# Patient Record
Sex: MALE | Race: OTHER | HISPANIC OR LATINO | URBAN - METROPOLITAN AREA
[De-identification: names, ages, dates, MRNs, and addresses within clinical notes are randomized per-mention and may not be internally consistent; named-entity substitution may affect disease eponyms.]

---

## 2021-11-10 ENCOUNTER — OFFICE VISIT (OUTPATIENT)
Dept: URBAN - METROPOLITAN AREA CLINIC 19 | Facility: CLINIC | Age: 61
End: 2021-11-10

## 2023-03-14 ENCOUNTER — LAB OUTSIDE AN ENCOUNTER (OUTPATIENT)
Dept: URBAN - METROPOLITAN AREA CLINIC 19 | Facility: CLINIC | Age: 63
End: 2023-03-14

## 2023-03-14 ENCOUNTER — OFFICE VISIT (OUTPATIENT)
Dept: URBAN - METROPOLITAN AREA CLINIC 19 | Facility: CLINIC | Age: 63
End: 2023-03-14
Payer: MEDICARE

## 2023-03-14 ENCOUNTER — WEB ENCOUNTER (OUTPATIENT)
Dept: URBAN - METROPOLITAN AREA CLINIC 19 | Facility: CLINIC | Age: 63
End: 2023-03-14

## 2023-03-14 VITALS
BODY MASS INDEX: 36.64 KG/M2 | SYSTOLIC BLOOD PRESSURE: 122 MMHG | DIASTOLIC BLOOD PRESSURE: 84 MMHG | HEIGHT: 69 IN | OXYGEN SATURATION: 97 % | HEART RATE: 80 BPM | TEMPERATURE: 97.5 F | WEIGHT: 247.4 LBS

## 2023-03-14 DIAGNOSIS — K76.0 HEPATIC STEATOSIS: ICD-10-CM

## 2023-03-14 PROBLEM — 197321007: Status: ACTIVE | Noted: 2023-03-14

## 2023-03-14 PROCEDURE — 99203 OFFICE O/P NEW LOW 30 MIN: CPT | Performed by: NURSE PRACTITIONER

## 2023-03-14 RX ORDER — ALBUTEROL SULFATE 90 UG/1
AEROSOL, METERED RESPIRATORY (INHALATION)
Qty: 0 | Refills: 0 | Status: ACTIVE | COMMUNITY
Start: 1900-01-01

## 2023-03-14 RX ORDER — APIXABAN 5 MG/1
1 TABLET TABLET, FILM COATED ORAL TWICE A DAY
Status: ACTIVE | COMMUNITY

## 2023-03-14 NOTE — HPI-TODAY'S VISIT:
Last seen in our clinic back in 2019 for dysphagia.  Last EGD done by Dr. Benítez 7/2/2019 found mildly severe esophagitis, surgically altered GE junction, due to dysphagia symptoms he was dilated, mild erythema in the gastric antrum, duodenum was normal.  Path with mild chronic inactive gastritis and chemical/reactive gastropathy.  No H. pylori.  No EOE Colonoscopy done by Dr. Benítez on 8/6/2019.  A 4 mm polyp removed from the sigmoid colon, diverticulosis in the sigmoid colon.  Path hyperplastic polyp.

## 2023-03-14 NOTE — HPI-TODAY'S VISIT:
Mr. Valenzuela is a 63 yo male with PMH of HTN, sleep apnea with CPAP, hernia repair in 1995, Afib s/p cardiac ablation on Eliquis who presents today to discuss fatty liver.  He reports back in 2017, he presented to ER for abdominal pain with N/V. CT scan was done on 1/14/2017 and showed moderate sized hiatal hernia. Diffuse low-attenuation of the liver, suggestive of hepatic steatosis. Diffuse mild dilatation of the small bowel with several air-fluid levels. No focal transition point. Enlarged prostate. Fat containing left inguinal hernia.   He states he never really followed up on it, read the report recently so wanted to discuss.  Reports occasional abdominal bloating after eating. No dysphagia, heartburn/reflux, nausea or vomiting. No changes in bowel habits. No pain. No bloody or black stools.

## 2023-03-15 LAB
A/G RATIO: 1.4
ALBUMIN: 4.4
ALKALINE PHOSPHATASE: 67
ALT (SGPT): 26
AST (SGOT): 16
BILIRUBIN, TOTAL: 0.4
BUN/CREATININE RATIO: (no result)
BUN: 20
CALCIUM: 9.9
CARBON DIOXIDE, TOTAL: 28
CHLORIDE: 104
CREATININE: 0.81
EGFR: 99
GLOBULIN, TOTAL: 3.1
GLUCOSE: 91
HEMATOCRIT: 43.6
HEMOGLOBIN: 15.1
MCH: 29.5
MCHC: 34.6
MCV: 85.2
MPV: 9.9
PLATELET COUNT: 329
POTASSIUM: 4.4
PROTEIN, TOTAL: 7.5
RDW: 12.6
RED BLOOD CELL COUNT: 5.12
SODIUM: 142
WHITE BLOOD CELL COUNT: 6.7

## 2023-03-22 ENCOUNTER — OFFICE VISIT (OUTPATIENT)
Dept: URBAN - METROPOLITAN AREA CLINIC 18 | Facility: CLINIC | Age: 63
End: 2023-03-22

## 2023-03-29 ENCOUNTER — OFFICE VISIT (OUTPATIENT)
Dept: URBAN - METROPOLITAN AREA CLINIC 18 | Facility: CLINIC | Age: 63
End: 2023-03-29
Payer: MEDICARE

## 2023-03-29 DIAGNOSIS — K76.0 HEPATIC STEATOSIS: ICD-10-CM

## 2023-03-29 PROCEDURE — 76705 ECHO EXAM OF ABDOMEN: CPT | Performed by: INTERNAL MEDICINE

## 2023-03-29 RX ORDER — APIXABAN 5 MG/1
1 TABLET TABLET, FILM COATED ORAL TWICE A DAY
Status: ACTIVE | COMMUNITY

## 2023-03-29 RX ORDER — ALBUTEROL SULFATE 90 UG/1
AEROSOL, METERED RESPIRATORY (INHALATION)
Qty: 0 | Refills: 0 | Status: ACTIVE | COMMUNITY
Start: 1900-01-01

## 2023-11-30 ENCOUNTER — LAB OUTSIDE AN ENCOUNTER (OUTPATIENT)
Dept: URBAN - METROPOLITAN AREA CLINIC 19 | Facility: CLINIC | Age: 63
End: 2023-11-30

## 2023-11-30 ENCOUNTER — TELEPHONE ENCOUNTER (OUTPATIENT)
Dept: URBAN - METROPOLITAN AREA CLINIC 19 | Facility: CLINIC | Age: 63
End: 2023-11-30

## 2023-11-30 ENCOUNTER — CLAIMS CREATED FROM THE CLAIM WINDOW (OUTPATIENT)
Dept: URBAN - METROPOLITAN AREA CLINIC 19 | Facility: CLINIC | Age: 63
End: 2023-11-30
Payer: MEDICARE

## 2023-11-30 VITALS
HEIGHT: 69 IN | WEIGHT: 245.8 LBS | OXYGEN SATURATION: 96 % | TEMPERATURE: 97 F | HEART RATE: 61 BPM | SYSTOLIC BLOOD PRESSURE: 130 MMHG | BODY MASS INDEX: 36.4 KG/M2 | DIASTOLIC BLOOD PRESSURE: 70 MMHG

## 2023-11-30 DIAGNOSIS — R13.19 ESOPHAGEAL DYSPHAGIA: ICD-10-CM

## 2023-11-30 DIAGNOSIS — K20.90 ESOPHAGITIS: ICD-10-CM

## 2023-11-30 PROCEDURE — 99214 OFFICE O/P EST MOD 30 MIN: CPT | Performed by: NURSE PRACTITIONER

## 2023-11-30 RX ORDER — PANTOPRAZOLE SODIUM 40 MG/1
1 TABLET TABLET, DELAYED RELEASE ORAL ONCE A DAY
Qty: 90 TABLET | Refills: 3 | OUTPATIENT
Start: 2023-11-30

## 2023-11-30 RX ORDER — ALBUTEROL SULFATE 90 UG/1
AEROSOL, METERED RESPIRATORY (INHALATION)
Qty: 0 | Refills: 0 | Status: ACTIVE | COMMUNITY
Start: 1900-01-01

## 2023-11-30 RX ORDER — APIXABAN 5 MG/1
1 TABLET TABLET, FILM COATED ORAL TWICE A DAY
Status: ACTIVE | COMMUNITY

## 2023-11-30 NOTE — HPI-TODAY'S VISIT:
Mr. Valenzuela is a 64 yo male with PMH of HTN, sleep apnea with CPAP, hernia repair in 1995, Afib s/p cardiac ablation on Eliquis who presents today for ED follow-up of dysphagia.   He was last seen in GI clinic 3/14/2023 for hepatic steatosis.  CBC, CMP at the time unremarkable RUQ US 3/29/2023-echogenic liver suggestive of hepatic steatosis.  No other significant finding.  Presented to [facility] ED on 11/26/2023 complaining of choking episode.  He reported that he was sitting watching football eating pork rice and peas.  He started choking and felt like something was lodged in his esophagus.  Was able to cough it out but has not been able to handle his secretions or tolerate any oral intake since.  Tried carbonated beverages but vomited them back up.  At 1 point had to pull over on the side of the road and vomit secretions.  He still felt like he had a piece of pork lodged in his mid esophagus.  CBC, CMP, PT/INR unremarkable.  CXR with linear scarring at the left lateral lung base.  GI consulted for urgent EGD.  EGD was performed by Dr. Yu grade C acute esophagitis without bleeding.  Food found in the stomach.  Erythematous mucosa in the gastric body.  An antireflux surgical site was found characterized by healthy-appearing mucosa.  Normal duodenum.  Protonix 40 twice daily for 4 weeks.  Path:Distal esophagus biopsy-squamous epithelium with parakeratosis.  Negative for intraepithelial eosinophils.  Mid esophagus-squamous epithelium with no pathologic abnormality negative for intraepithelial eosinophils.  Of note he had a remote history of fundoplication for his hiatal hernia, reports he had barium swallow and was told his wrap may be getting undone.  Has had occasional trouble swallowing in the past but this was his first impaction.   10/25/2021 Barium swallow by Dr. Sherwood:  Status post fundoplication. The wrap appears intact, however it is herniated above the diaphragm. There is a moderate hiatal hernia present. 12.5 mm barium tablet halted at the GE junction at the level of the fundoplication and did not pass even with additional sips of barium. Very mild gastroesophageal reflux seen while the patient was supine. Otherwise unremarkable.    ----------------------------------- Prior history:        He reports back in 2017, he presented to ER for abdominal pain with N/V. CT scan was done on 1/14/2017 and showed moderate sized hiatal hernia. Diffuse low-attenuation of the liver, suggestive of hepatic steatosis. Diffuse mild dilatation of the small bowel with several air-fluid levels. No focal transition point. Enlarged prostate. Fat containing left inguinal hernia.        He states he never really followed up on it, read the report recently so wanted to discuss.         Last seen in our clinic back in 2019 for dysphagia. Last EGD done by Dr. Benítez 7/2/2019 found mildly severe esophagitis, surgically altered GE junction, due to dysphagia symptoms he was dilated, mild erythema in the gastric antrum, duodenum was normal. Path with mild chronic inactive gastritis and chemical/reactive gastropathy. No H. pylori. No EOE        Colonoscopy done by Dr. Benítez on 8/6/2019. A 4 mm polyp removed from the sigmoid colon, diverticulosis in the sigmoid colon. Path hyperplastic polyp...

## 2024-01-22 ENCOUNTER — OFFICE VISIT (OUTPATIENT)
Dept: URBAN - METROPOLITAN AREA CLINIC 19 | Facility: CLINIC | Age: 64
End: 2024-01-22
Payer: MEDICARE

## 2024-01-22 ENCOUNTER — LAB OUTSIDE AN ENCOUNTER (OUTPATIENT)
Dept: URBAN - METROPOLITAN AREA CLINIC 19 | Facility: CLINIC | Age: 64
End: 2024-01-22

## 2024-01-22 VITALS
HEIGHT: 69 IN | BODY MASS INDEX: 36.5 KG/M2 | WEIGHT: 246.4 LBS | TEMPERATURE: 97.2 F | DIASTOLIC BLOOD PRESSURE: 90 MMHG | SYSTOLIC BLOOD PRESSURE: 152 MMHG | HEART RATE: 74 BPM | OXYGEN SATURATION: 95 %

## 2024-01-22 DIAGNOSIS — K20.90 ESOPHAGITIS: ICD-10-CM

## 2024-01-22 DIAGNOSIS — K76.0 HEPATIC STEATOSIS: ICD-10-CM

## 2024-01-22 DIAGNOSIS — R13.19 ESOPHAGEAL DYSPHAGIA: ICD-10-CM

## 2024-01-22 DIAGNOSIS — Z86.010 COLON POLYP HISTORY: ICD-10-CM

## 2024-01-22 PROCEDURE — 99214 OFFICE O/P EST MOD 30 MIN: CPT | Performed by: NURSE PRACTITIONER

## 2024-01-22 RX ORDER — ALBUTEROL SULFATE 90 UG/1
AEROSOL, METERED RESPIRATORY (INHALATION)
Qty: 0 | Refills: 0 | Status: ACTIVE | COMMUNITY
Start: 1900-01-01

## 2024-01-22 RX ORDER — PANTOPRAZOLE SODIUM 40 MG/1
1 TABLET TABLET, DELAYED RELEASE ORAL ONCE A DAY
Qty: 90 TABLET | Refills: 3 | Status: ACTIVE | COMMUNITY
Start: 2023-11-30

## 2024-01-22 RX ORDER — APIXABAN 5 MG/1
1 TABLET TABLET, FILM COATED ORAL TWICE A DAY
Status: ACTIVE | COMMUNITY

## 2024-01-22 NOTE — HPI-TODAY'S VISIT:
Mr. Valenzuela is a 64 yo male with PMH of HTN, sleep apnea with CPAP, hernia repair in 1995, Afib s/p cardiac ablation on Eliquis, hepatic steatosis who presents today for follow-up.   Last seen in GI clinic 11/30/2023 for ED follow up of dysphagia.  Barium swallow ordered for complaints of esophageal dysphagia - 12/15/2023 - the patient is again noted s/p fundoplication. The wrap again appears to be superior to the diaphram with moderate hiatal hernia present. Barium traverse the GE junction at the level of the fundoplication. Moderate reflux. Small diverticulum which apears to be at the posterior oropharynx.  He is still having issues with dysphagia. Continues on Pantoprazole 40mg/day. Due for colonsocopy this year.    ----------------------------------- Prior history: He reports back in 2017, he presented to ER for abdominal pain with N/V. CT scan was done on 1/14/2017 and showed moderate sized hiatal hernia. Diffuse low-attenuation of the liver, suggestive of hepatic steatosis. Diffuse mild dilatation of the small bowel with several air-fluid levels. No focal transition point. Enlarged prostate. Fat containing left inguinal hernia. He states he never really followed up on it, read the report recently so wanted to discuss.  Last seen in our clinic back in 2019 for dysphagia. Last EGD done by Dr. Benítez 7/2/2019 found mildly severe esophagitis, surgically altered GE junction, due to dysphagia symptoms he was dilated, mild erythema in the gastric antrum, duodenum was normal. Path with mild chronic inactive gastritis and chemical/reactive gastropathy. No H. pylori. No EOE Colonoscopy done by Dr. Benítez on 8/6/2019. A 4 mm polyp removed from the sigmoid colon, diverticulosis in the sigmoid colon. Path hyperplastic polyp.  10/25/2021 Barium swallow by Dr. Sherwood:  Status post fundoplication. The wrap appears intact, however it is herniated above the diaphragm. There is a moderate hiatal hernia present. 12.5 mm barium tablet halted at the GE junction at the level of the fundoplication and did not pass even with additional sips of barium. Very mild gastroesophageal reflux seen while the patient was supine. Otherwise unremarkable.   Presented to [facility] ED on 11/26/2023 complaining of choking episode. He reported that he was sitting watching football eating pork rice and peas. He started choking and felt like something was lodged in his esophagus. Was able to cough it out but has not been able to handle his secretions or tolerate any oral intake since. Tried carbonated beverages but vomited them back up. At 1 point had to pull over on the side of the road and vomit secretions. He still felt like he had a piece of pork lodged in his mid esophagus. CBC, CMP, PT/INR unremarkable. CXR with linear scarring at the left lateral lung base. GI consulted for urgent EGD.  EGD was performed by Dr. Yu grade C acute esophagitis without bleeding. Food found in the stomach. Erythematous mucosa in the gastric body. An antireflux surgical site was found characterized by healthy-appearing mucosa. Normal duodenum. Path:Distal esophagus biopsy-squamous epithelium with parakeratosis. Negative for intraepithelial eosinophils. Mid esophagus-squamous epithelium with no pathologic abnormality negative for intraepithelial eosinophils.  Protonix 40 BID for 4 weeks ordered  Of note he had a remote history of fundoplication for his hiatal hernia, reports he had barium swallow and was told his wrap may be getting undone. Has had occasional trouble swallowing in the past but this was his first impaction.  Seen in GI clinic 3/14/2023 for hepatic steatosis. CBC, CMP at the time unremarkable RUQ US 3/29/2023-echogenic liver suggestive of hepatic steatosis. No other significant finding..

## 2024-01-23 LAB
ALBUMIN/GLOBULIN RATIO: 1.5
ALBUMIN: 3.8
ALKALINE PHOSPHATASE: 59
ALT: 21
AST: 12
BILIRUBIN, TOTAL: 0.3
BUN/CREATININE RATIO: (no result)
CALCIUM: 8.7
CARBON DIOXIDE: 27
CHLORIDE: 103
CREATININE: 0.7
EGFR: 103
FIB 4 INDEX: 0.65
FIB 4 INTERPRETATION: (no result)
GLOBULIN: 2.6
GLUCOSE: 87
HEMATOCRIT: 42.5
HEMOGLOBIN: 14
INR: 1.1
MCH: 29.2
MCHC: 32.9
MCV: 88.5
MPV: 9.8
PLATELET COUNT: 257
PLATELET COUNT: 257
POTASSIUM: 4.1
PROTEIN, TOTAL: 6.4
PT: 11.4
RDW: 12.9
RED BLOOD CELL COUNT: 4.8
SODIUM: 140
UREA NITROGEN (BUN): 16
WHITE BLOOD CELL COUNT: 7.5

## 2024-01-31 ENCOUNTER — TELEPHONE ENCOUNTER (OUTPATIENT)
Dept: URBAN - METROPOLITAN AREA CLINIC 19 | Facility: CLINIC | Age: 64
End: 2024-01-31

## 2024-01-31 ENCOUNTER — OFFICE VISIT (OUTPATIENT)
Dept: URBAN - METROPOLITAN AREA CLINIC 18 | Facility: CLINIC | Age: 64
End: 2024-01-31
Payer: MEDICARE

## 2024-01-31 DIAGNOSIS — N28.1 RENAL CYST, ACQUIRED, RIGHT: ICD-10-CM

## 2024-01-31 DIAGNOSIS — K76.0 FATTY (CHANGE OF) LIVER: ICD-10-CM

## 2024-01-31 PROCEDURE — 76705 ECHO EXAM OF ABDOMEN: CPT | Performed by: INTERNAL MEDICINE

## 2024-03-20 ENCOUNTER — LAB (OUTPATIENT)
Dept: URBAN - METROPOLITAN AREA CLINIC 4 | Facility: CLINIC | Age: 64
End: 2024-03-20
Payer: MEDICARE

## 2024-03-20 ENCOUNTER — C/E W/ DIL (OUTPATIENT)
Dept: URBAN - METROPOLITAN AREA SURGERY CENTER 31 | Facility: SURGERY CENTER | Age: 64
End: 2024-03-20
Payer: MEDICARE

## 2024-03-20 DIAGNOSIS — K31.89 ACHYLIA: ICD-10-CM

## 2024-03-20 DIAGNOSIS — R13.10 ABNORMAL DEGLUTITION: ICD-10-CM

## 2024-03-20 DIAGNOSIS — K31.89 OTHER DISEASES OF STOMACH AND DUODENUM: ICD-10-CM

## 2024-03-20 DIAGNOSIS — K21.9 GASTRO-ESOPHAGEAL REFLUX DISEASE WITHOUT ESOPHAGITIS: ICD-10-CM

## 2024-03-20 DIAGNOSIS — Z86.010 ADENOMAS PERSONAL HISTORY OF COLONIC POLYPS: ICD-10-CM

## 2024-03-20 DIAGNOSIS — Z09 CARDIOLOGY FOLLOW-UP ENCOUNTER: ICD-10-CM

## 2024-03-20 DIAGNOSIS — K21.9 ACID REFLUX: ICD-10-CM

## 2024-03-20 PROCEDURE — G0105 COLORECTAL SCRN; HI RISK IND: HCPCS | Performed by: INTERNAL MEDICINE

## 2024-03-20 PROCEDURE — 43239 EGD BIOPSY SINGLE/MULTIPLE: CPT | Performed by: INTERNAL MEDICINE

## 2024-03-20 PROCEDURE — 43249 ESOPH EGD DILATION <30 MM: CPT | Performed by: INTERNAL MEDICINE

## 2024-03-20 PROCEDURE — 88312 SPECIAL STAINS GROUP 1: CPT | Performed by: PATHOLOGY

## 2024-03-20 PROCEDURE — G8907 PT DOC NO EVENTS ON DISCHARG: HCPCS | Performed by: INTERNAL MEDICINE

## 2024-03-20 PROCEDURE — 88305 TISSUE EXAM BY PATHOLOGIST: CPT | Performed by: PATHOLOGY

## 2024-03-20 RX ORDER — ALBUTEROL SULFATE 90 UG/1
AEROSOL, METERED RESPIRATORY (INHALATION)
Qty: 0 | Refills: 0 | Status: ACTIVE | COMMUNITY
Start: 1900-01-01

## 2024-03-20 RX ORDER — APIXABAN 5 MG/1
1 TABLET TABLET, FILM COATED ORAL TWICE A DAY
Status: ACTIVE | COMMUNITY

## 2024-03-20 RX ORDER — PANTOPRAZOLE SODIUM 40 MG/1
1 TABLET TABLET, DELAYED RELEASE ORAL ONCE A DAY
Qty: 90 TABLET | Refills: 3 | Status: ACTIVE | COMMUNITY
Start: 2023-11-30

## 2024-03-28 ENCOUNTER — OV EP (OUTPATIENT)
Dept: URBAN - METROPOLITAN AREA CLINIC 19 | Facility: CLINIC | Age: 64
End: 2024-03-28
Payer: MEDICARE

## 2024-03-28 VITALS
HEART RATE: 71 BPM | SYSTOLIC BLOOD PRESSURE: 152 MMHG | OXYGEN SATURATION: 97 % | DIASTOLIC BLOOD PRESSURE: 90 MMHG | TEMPERATURE: 97.5 F | HEIGHT: 69 IN | BODY MASS INDEX: 36.97 KG/M2 | WEIGHT: 249.6 LBS

## 2024-03-28 DIAGNOSIS — K57.90 DIVERTICULOSIS: ICD-10-CM

## 2024-03-28 DIAGNOSIS — K44.9 HIATAL HERNIA: ICD-10-CM

## 2024-03-28 DIAGNOSIS — K21.9 GERD: ICD-10-CM

## 2024-03-28 DIAGNOSIS — Z98.890 HISTORY OF FUNDOPLICATION: ICD-10-CM

## 2024-03-28 DIAGNOSIS — K76.0 HEPATIC STEATOSIS: ICD-10-CM

## 2024-03-28 PROCEDURE — 99214 OFFICE O/P EST MOD 30 MIN: CPT | Performed by: NURSE PRACTITIONER

## 2024-03-28 RX ORDER — PANTOPRAZOLE SODIUM 40 MG/1
1 TABLET TABLET, DELAYED RELEASE ORAL ONCE A DAY
Qty: 90 TABLET | Refills: 3 | Status: ACTIVE | COMMUNITY
Start: 2023-11-30

## 2024-03-28 RX ORDER — ALBUTEROL SULFATE 90 UG/1
AEROSOL, METERED RESPIRATORY (INHALATION)
Qty: 0 | Refills: 0 | Status: ACTIVE | COMMUNITY
Start: 1900-01-01

## 2024-03-28 RX ORDER — APIXABAN 5 MG/1
1 TABLET TABLET, FILM COATED ORAL TWICE A DAY
Status: ACTIVE | COMMUNITY

## 2024-03-28 RX ORDER — TADALAFIL 5 MG/1
1 TABLET AS NEEDED TABLET, FILM COATED ORAL ONCE A DAY
Status: ACTIVE | COMMUNITY

## 2024-03-28 NOTE — HPI-TODAY'S VISIT:
Mr. Valenzuela is a 62 yo male with PMH of HTN, sleep apnea with CPAP, hernia repair in 1995, Afib s/p cardiac ablation on Eliquis, hepatic steatosis who presents today for follow-up. Last seen in GI clinic 1/22/2024.  Last US was done on 1/31/2024-echogenic liver essentially unchanged.  Consider fatty infiltration.  Simple cyst of right kidney new on current study.  EGD colonoscopy was performed by Dr. Benítez on 3/20/2024 EGD-medium sized hiatal hernia, erythematous mucosa in the antrum, normal duodenum.  Dilation performed in the lower third of the esophagus. Path:Stomach antrum-negative esophagus middle and lower third biopsies with reflux type changes; negative for Mercado's or EOE. Colonoscopy-diverticulosis in the sigmoid colon, internal hemorrhoids.  No specimens collected.  5-year follow-up given.  Some occasional cramping to LLQ and RLQ, comes and goes. BMs sometimes hard and sometimes diarrhea. Drinks plenty of water, he feels at least 64 oz/day. He weaned off PPI, now only taking Tums and states his reflux has been well-controlled with this. Denies further trouble swallowing at this time.    ----------------------------------- Prior history: -He reports back in 2017, he presented to ER for abdominal pain with N/V. CT scan was done on 1/14/2017 and showed moderate sized hiatal hernia. Diffuse low-attenuation of the liver, suggestive of hepatic steatosis. Diffuse mild dilatation of the small bowel with several air-fluid levels. No focal transition point. Enlarged prostate. Fat containing left inguinal hernia. He states he never really followed up on it.  -Seen in our clinic back in 2019 for dysphagia. EGD done by Dr. Benítez 7/2/2019 found mildly severe esophagitis, surgically altered GE junction, due to dysphagia symptoms he was dilated, mild erythema in the gastric antrum, duodenum was normal. Path with mild chronic inactive gastritis and chemical/reactive gastropathy. No H. pylori. No EOE. Colonoscopy done by Dr. Benítez on 8/6/2019. A 4 mm polyp removed from the sigmoid colon, diverticulosis in the sigmoid colon. Path: hyperplastic polyp.  -10/25/2021 Barium swallow by Dr. Sherwood:  Status post fundoplication. The wrap appears intact, however it is herniated above the diaphragm. There is a moderate hiatal hernia present. 12.5 mm barium tablet halted at the GE junction at the level of the fundoplication and did not pass even with additional sips of barium. Very mild gastroesophageal reflux seen while the patient was supine. Otherwise unremarkable.  -[facility] complaining of choking episode. He reported that he was sitting watching football eating pork rice and peas. He started choking and felt like something was lodged in his esophagus. Was able to cough it out but has not been able to handle his secretions or tolerate any oral intake since. Tried carbonated beverages but vomited them back up. At one point had to pull over on the side of the road and vomit secretions. He still felt like he had a piece of pork lodged in his mid esophagus. CBC, CMP, PT/INR unremarkable. CXR with linear scarring at the left lateral lung base. GI consulted for urgent EGD. -EGD was performed by Dr. Fontanez-LA grade C acute esophagitis without bleeding. Food found in the stomach. Erythematous mucosa in the gastric body. An antireflux surgical site was found characterized by healthy-appearing mucosa. Normal duodenum. Path:Distal esophagus biopsy-squamous epithelium with parakeratosis. Negative for intraepithelial eosinophils. Mid esophagus-squamous epithelium with no pathologic abnormality negative for intraepithelial eosinophils. Protonix 40 BID for 4 weeks ordered  -Seen in GI clinic 3/14/2023 for hepatic steatosis. CBC, CMP at the time unremarkable RUQ US 3/29/2023-echogenic liver suggestive of hepatic steatosis. No other significant finding. -Seen in GI clinic 11/30/2023 for ED follow up of dysphagia.  Barium swallow 12/15/2023 - the patient is again noted s/p fundoplication. The wrap again appears to be superior to the diaphram with moderate hiatal hernia present. Barium traverse the GE junction at the level of the fundoplication. Moderate reflux. Small diverticulum which apears to be at the posterior oropharynx.

## 2024-03-29 LAB
ALBUMIN/GLOBULIN RATIO: 1.6
ALBUMIN: 4.2
ALKALINE PHOSPHATASE: 63
ALT: 20
AST: 14
BILIRUBIN, TOTAL: 0.6
BUN/CREATININE RATIO: (no result)
CALCIUM: 9.4
CARBON DIOXIDE: 28
CHLORIDE: 101
CREATININE: 0.74
EGFR: 101
FIB 4 INDEX: 0.88
FIB 4 INTERPRETATION: (no result)
FIB 4 SUMMARY: (no result)
GLOBULIN: 2.7
GLUCOSE: 108
HEMATOCRIT: 40.7
HEMOGLOBIN: 13.6
INR: 1.1
MCH: 28.9
MCHC: 33.4
MCV: 86.6
MPV: 10.2
PLATELET COUNT: 227
PLATELET COUNT: 227
POTASSIUM: 4.3
PROTEIN, TOTAL: 6.9
PT: 11.5
RDW: 13.7
RED BLOOD CELL COUNT: 4.7
SODIUM: 138
UREA NITROGEN (BUN): 12
WHITE BLOOD CELL COUNT: 7.4

## 2024-06-11 ENCOUNTER — TELEPHONE ENCOUNTER (OUTPATIENT)
Dept: URBAN - METROPOLITAN AREA CLINIC 19 | Facility: CLINIC | Age: 64
End: 2024-06-11

## 2024-06-28 ENCOUNTER — OFFICE VISIT (OUTPATIENT)
Dept: URBAN - METROPOLITAN AREA CLINIC 19 | Facility: CLINIC | Age: 64
End: 2024-06-28
Payer: MEDICARE

## 2024-06-28 ENCOUNTER — DASHBOARD ENCOUNTERS (OUTPATIENT)
Age: 64
End: 2024-06-28

## 2024-06-28 VITALS
HEIGHT: 69 IN | HEART RATE: 66 BPM | OXYGEN SATURATION: 99 % | SYSTOLIC BLOOD PRESSURE: 139 MMHG | WEIGHT: 232.4 LBS | BODY MASS INDEX: 34.42 KG/M2 | TEMPERATURE: 98.6 F | DIASTOLIC BLOOD PRESSURE: 71 MMHG

## 2024-06-28 DIAGNOSIS — K76.0 HEPATIC STEATOSIS: ICD-10-CM

## 2024-06-28 DIAGNOSIS — K44.9 HIATAL HERNIA: ICD-10-CM

## 2024-06-28 PROCEDURE — 99214 OFFICE O/P EST MOD 30 MIN: CPT | Performed by: NURSE PRACTITIONER

## 2024-06-28 RX ORDER — ALBUTEROL SULFATE 90 UG/1
AEROSOL, METERED RESPIRATORY (INHALATION)
Qty: 0 | Refills: 0 | Status: ACTIVE | COMMUNITY
Start: 1900-01-01

## 2024-06-28 RX ORDER — APIXABAN 5 MG/1
1 TABLET TABLET, FILM COATED ORAL TWICE A DAY
Status: ACTIVE | COMMUNITY

## 2024-06-28 RX ORDER — PANTOPRAZOLE SODIUM 40 MG/1
1 TABLET TABLET, DELAYED RELEASE ORAL ONCE A DAY
Qty: 90 TABLET | Refills: 3 | Status: ACTIVE | COMMUNITY
Start: 2023-11-30

## 2024-06-28 RX ORDER — TADALAFIL 5 MG/1
1 TABLET AS NEEDED TABLET, FILM COATED ORAL ONCE A DAY
Status: ACTIVE | COMMUNITY

## 2024-06-28 NOTE — PHYSICAL EXAM GASTROINTESTINAL
Abdomen , soft, nontender, nondistended , no guarding or rigidity , no masses palpable , Liver and Spleen,  no hepatosplenomegaly

## 2024-06-28 NOTE — HPI-TODAY'S VISIT:
Mr. Valenzuela is a 62 yo male with PMH of HTN, ROLAND on CPAP, hernia repair in 1995, Afib s/p cardiac ablation on Eliquis, hepatic steatosis who presents today for follow-up.   US was on 1/31/2024-echogenic liver essentially unchanged. Consider fatty infiltration. Simple cyst of right kidney new on current study.  EGD colonoscopy was performed by Dr. Benítez on 3/20/2024 EGD-medium sized hiatal hernia, erythematous mucosa in the antrum, normal duodenum. Dilation performed in the lower third of the esophagus. Path:Stomach antrum-negative esophagus middle and lower third biopsies with reflux type changes; negative for Mercado's or EOE. Colonoscopy-diverticulosis in the sigmoid colon, internal hemorrhoids. No specimens collected. 5-year follow-up given.  He weaned off PPI, to only taking Tums PRN. He reports trying to lose weight with Atkins diet and appears to have lost about 19 lbs since last visit.  He was referred to Dr. Lockhart to discuss hernia in setting of history of fundoplication. Still awaiting esohageal manometry which he has not yet gotten scheduled.      ----------------------------------- Prior history: -He reports back in 2017, he presented to ER for abdominal pain with N/V. CT scan was done on 1/14/2017 and showed moderate sized hiatal hernia. Diffuse low-attenuation of the liver, suggestive of hepatic steatosis. Diffuse mild dilatation of the small bowel with several air-fluid levels. No focal transition point. Enlarged prostate. Fat containing left inguinal hernia. He states he never really followed up on it.  -Seen in our clinic back in 2019 for dysphagia. EGD done by Dr. Benítez 7/2/2019 found mildly severe esophagitis, surgically altered GE junction, due to dysphagia symptoms he was dilated, mild erythema in the gastric antrum, duodenum was normal. Path with mild chronic inactive gastritis and chemical/reactive gastropathy. No H. pylori. No EOE. Colonoscopy done by Dr. Benítez on 8/6/2019 - 4 mm (benign) polyp removed from the sigmoid colon, diverticulosis in the sigmoid colon.   -10/25/2021 Barium swallow by Dr. Sherwood:  Status post fundoplication. The wrap appears intact, however it is herniated above the diaphragm. There is a moderate hiatal hernia present. 12.5 mm barium tablet halted at the GE junction at the level of the fundoplication and did not pass even with additional sips of barium. Very mild gastroesophageal reflux seen while the patient was supine. Otherwise unremarkable.  -Presented to [facility] ED on 11/26/2023 complaining of choking episode. He reported that he was sitting watching football eating pork rice and peas. He started choking and felt like something was lodged in his esophagus. Was able to cough it out but has not been able to handle his secretions or tolerate any oral intake since. Tried carbonated beverages but vomited them back up. At one point had to pull over on the side of the road and vomit secretions. He still felt like he had a piece of pork lodged in his mid esophagus. CBC, CMP, PT/INR unremarkable. CXR with linear scarring at the left lateral lung base. GI consulted for urgent EGD. -EGD was performed by Dr. Fontanez-LA grade C acute esophagitis without bleeding. Food found in the stomach. Erythematous mucosa in the gastric body. An antireflux surgical site was found characterized by healthy-appearing mucosa. Normal duodenum. Path:Distal esophagus biopsy-squamous epithelium with parakeratosis. Negative for intraepithelial eosinophils. Mid esophagus-squamous epithelium with no pathologic abnormality negative for intraepithelial eosinophils. Protonix 40 BID for 4 weeks ordered  -Seen in GI clinic 3/14/2023 for hepatic steatosis. CBC, CMP at the time unremarkable RUQ US 3/29/2023-echogenic liver suggestive of hepatic steatosis. No other significant finding.  -Seen in GI clinic 11/30/2023 for ED follow up of dysphagia.  Barium swallow 12/15/2023 - the patient is again noted s/p fundoplication. The wrap again appears to be superior to the diaphram with moderate hiatal hernia present. Barium traverse the GE junction at the level of the fundoplication. Moderate reflux. Small diverticulum which apears to be at the posterior oropharynx.

## 2024-06-29 LAB
A/G RATIO: 1.6
ALBUMIN: 4.4
ALKALINE PHOSPHATASE: 66
ALT (SGPT): 21
AST (SGOT): 14
BILIRUBIN, TOTAL: 0.8
BUN/CREATININE RATIO: 29
BUN: 20
CALCIUM: 9.2
CARBON DIOXIDE, TOTAL: 26
CHLORIDE: 103
CREATININE: 0.69
EGFR: 103
GLOBULIN, TOTAL: 2.7
GLUCOSE: 88
HEMATOCRIT: 44.5
HEMOGLOBIN: 14.6
INR: 1.1
MCH: 29.2
MCHC: 32.8
MCV: 89
MPV: 9.8
PLATELET COUNT: 204
POTASSIUM: 4.3
PROTEIN, TOTAL: 7.1
PT: 11.6
RDW: 13.5
RED BLOOD CELL COUNT: 5
SODIUM: 138
WHITE BLOOD CELL COUNT: 5.6

## 2024-07-15 ENCOUNTER — TELEPHONE ENCOUNTER (OUTPATIENT)
Dept: URBAN - METROPOLITAN AREA CLINIC 19 | Facility: CLINIC | Age: 64
End: 2024-07-15

## 2024-07-24 ENCOUNTER — OFFICE VISIT (OUTPATIENT)
Dept: URBAN - METROPOLITAN AREA MEDICAL CENTER 28 | Facility: MEDICAL CENTER | Age: 64
End: 2024-07-24

## 2024-08-09 ENCOUNTER — OFFICE VISIT (OUTPATIENT)
Dept: URBAN - METROPOLITAN AREA CLINIC 19 | Facility: CLINIC | Age: 64
End: 2024-08-09
Payer: MEDICARE

## 2024-08-09 ENCOUNTER — TELEPHONE ENCOUNTER (OUTPATIENT)
Dept: URBAN - METROPOLITAN AREA CLINIC 19 | Facility: CLINIC | Age: 64
End: 2024-08-09

## 2024-08-09 VITALS
DIASTOLIC BLOOD PRESSURE: 80 MMHG | SYSTOLIC BLOOD PRESSURE: 130 MMHG | HEART RATE: 64 BPM | TEMPERATURE: 98.4 F | WEIGHT: 234.8 LBS | BODY MASS INDEX: 34.78 KG/M2 | HEIGHT: 69 IN

## 2024-08-09 DIAGNOSIS — K76.0 HEPATIC STEATOSIS: ICD-10-CM

## 2024-08-09 DIAGNOSIS — K44.9 HIATAL HERNIA: ICD-10-CM

## 2024-08-09 PROCEDURE — 99212 OFFICE O/P EST SF 10 MIN: CPT | Performed by: NURSE PRACTITIONER

## 2024-08-09 RX ORDER — PANTOPRAZOLE SODIUM 40 MG/1
1 TABLET TABLET, DELAYED RELEASE ORAL ONCE A DAY
Qty: 90 TABLET | Refills: 3 | Status: ACTIVE | COMMUNITY
Start: 2023-11-30

## 2024-08-09 RX ORDER — APIXABAN 5 MG/1
1 TABLET TABLET, FILM COATED ORAL TWICE A DAY
Status: ACTIVE | COMMUNITY

## 2024-08-09 RX ORDER — TADALAFIL 5 MG/1
1 TABLET AS NEEDED TABLET, FILM COATED ORAL ONCE A DAY
Status: ACTIVE | COMMUNITY

## 2024-08-09 RX ORDER — ALBUTEROL SULFATE 90 UG/1
AEROSOL, METERED RESPIRATORY (INHALATION)
Qty: 0 | Refills: 0 | Status: ACTIVE | COMMUNITY
Start: 1900-01-01

## 2024-08-09 NOTE — HPI-TODAY'S VISIT:
Mr. Valenzuela is a 64 yo male with PMH of HTN, ROLAND on CPAP, hernia repair in 1995, Afib s/p cardiac ablation on Eliquis, hepatic steatosis who presents today for follow-up.   Last seen in GI clinic 6/28/2024 CBC was normal, creatinine 0.69, sodium 138, T. bili 0.8, ALP 66, AST 14, ALT 21, INR 1.1, platelets 227 Sodium MELD=9 7/1/2024 RUQ US: Hepatic steatosis.  Simple right renal cyst measuring 1.1 cm 7/31/2024 esophageal manometry was overall normal, normal impedence pH study  Last visit, he weaned off PPI, to only taking Tums PRN. He reported losing weight with Atkins diet and appeared he lost about 19 lbs since prior visit. Holding now at 234 lbs.  He reports after walking around, he notices occasionally having some edema to his lower extremities. Comes and goes. None now. Denies any increased swelling in his abdomen.      ----------------------------------- Prior history: -He reports back in 2017, he presented to ER for abdominal pain with N/V. CT scan was done on 1/14/2017 and showed moderate sized hiatal hernia. Diffuse low-attenuation of the liver, suggestive of hepatic steatosis. Diffuse mild dilatation of the small bowel with several air-fluid levels. No focal transition point. Enlarged prostate. Fat containing left inguinal hernia. He states he never really followed up on it.  -Seen in our clinic back in 2019 for dysphagia. EGD done by Dr. Benítez 7/2/2019 found mildly severe esophagitis, surgically altered GE junction, due to dysphagia symptoms he was dilated, mild erythema in the gastric antrum, duodenum was normal. Path with mild chronic inactive gastritis and chemical/reactive gastropathy. No H. pylori. No EOE. Colonoscopy done by Dr. Benítez on 8/6/2019 - 4 mm (benign) polyp removed from the sigmoid colon, diverticulosis in the sigmoid colon.   -10/25/2021 Barium swallow by Dr. Sherwood:  Status post fundoplication. The wrap appears intact, however it is herniated above the diaphragm. There is a moderate hiatal hernia present. 12.5 mm barium tablet halted at the GE junction at the level of the fundoplication and did not pass even with additional sips of barium. Very mild gastroesophageal reflux seen while the patient was supine. Otherwise unremarkable.  -Presented to [facility] ED on 11/26/2023 complaining of choking episode. He reported that he was sitting watching football eating pork rice and peas. He started choking and felt like something was lodged in his esophagus. Was able to cough it out but has not been able to handle his secretions or tolerate any oral intake since. Tried carbonated beverages but vomited them back up. At one point had to pull over on the side of the road and vomit secretions. He still felt like he had a piece of pork lodged in his mid esophagus. CBC, CMP, PT/INR unremarkable. CXR with linear scarring at the left lateral lung base. GI consulted for urgent EGD. -EGD was performed by Dr. Fontanez-LA grade C acute esophagitis without bleeding. Food found in the stomach. Erythematous mucosa in the gastric body. An antireflux surgical site was found characterized by healthy-appearing mucosa. Normal duodenum. Path:Distal esophagus biopsy-squamous epithelium with parakeratosis. Negative for intraepithelial eosinophils. Mid esophagus-squamous epithelium with no pathologic abnormality negative for intraepithelial eosinophils. Protonix 40 BID for 4 weeks ordered  -Seen in GI clinic 3/14/2023 for hepatic steatosis. CBC, CMP at the time unremarkable RUQ US 3/29/2023-echogenic liver suggestive of hepatic steatosis. No other significant finding.  -Seen in GI clinic 11/30/2023 for ED follow up of dysphagia.  Barium swallow 12/15/2023 - the patient is again noted s/p fundoplication. The wrap again appears to be superior to the diaphram with moderate hiatal hernia present. Barium traverse the GE junction at the level of the fundoplication. Moderate reflux. Small diverticulum which apears to be at the posterior oropharynx..  US was on 1/31/2024-echogenic liver essentially unchanged. Consider fatty infiltration. Simple cyst of right kidney new on current study.  EGD colonoscopy was performed by Dr. Benítez on 3/20/2024 EGD-medium sized hiatal hernia, erythematous mucosa in the antrum, normal duodenum. Dilation performed in the lower third of the esophagus. Path:Stomach antrum-negative esophagus middle and lower third biopsies with reflux type changes; negative for Mercado's or EOE. Colonoscopy-diverticulosis in the sigmoid colon, internal hemorrhoids. No specimens collected. 5-year follow-up given..

## 2024-10-09 ENCOUNTER — OFFICE VISIT (OUTPATIENT)
Dept: URBAN - METROPOLITAN AREA CLINIC 19 | Facility: CLINIC | Age: 64
End: 2024-10-09
Payer: MEDICARE

## 2024-10-09 VITALS
TEMPERATURE: 97.9 F | HEART RATE: 61 BPM | WEIGHT: 232.8 LBS | HEIGHT: 69 IN | SYSTOLIC BLOOD PRESSURE: 130 MMHG | BODY MASS INDEX: 34.48 KG/M2 | DIASTOLIC BLOOD PRESSURE: 80 MMHG

## 2024-10-09 DIAGNOSIS — Z98.890 HISTORY OF FUNDOPLICATION: ICD-10-CM

## 2024-10-09 DIAGNOSIS — K76.0 HEPATIC STEATOSIS: ICD-10-CM

## 2024-10-09 PROCEDURE — 99213 OFFICE O/P EST LOW 20 MIN: CPT | Performed by: NURSE PRACTITIONER

## 2024-10-09 RX ORDER — ALBUTEROL SULFATE 90 UG/1
AEROSOL, METERED RESPIRATORY (INHALATION)
Qty: 0 | Refills: 0 | Status: ACTIVE | COMMUNITY
Start: 1900-01-01

## 2024-10-09 RX ORDER — PANTOPRAZOLE SODIUM 40 MG/1
1 TABLET TABLET, DELAYED RELEASE ORAL ONCE A DAY
Qty: 90 TABLET | Refills: 3 | Status: ACTIVE | COMMUNITY
Start: 2023-11-30

## 2024-10-09 RX ORDER — TELMISARTAN 40 MG/1
1 TABLET TABLET ORAL ONCE A DAY
Status: ACTIVE | COMMUNITY

## 2024-10-09 RX ORDER — APIXABAN 5 MG/1
1 TABLET TABLET, FILM COATED ORAL TWICE A DAY
Status: ACTIVE | COMMUNITY

## 2024-10-09 RX ORDER — TADALAFIL 5 MG/1
1 TABLET AS NEEDED TABLET, FILM COATED ORAL ONCE A DAY
Status: ACTIVE | COMMUNITY

## 2024-10-09 NOTE — HPI-TODAY'S VISIT:
Mr. Valenzuela is a 62 yo male with PMH of HTN, ROLAND on CPAP, hernia repair in 1995, Afib s/p cardiac ablation on Eliquis, hepatic steatosis who presents today for follow-up.   6/28/2024 CBC was normal, creatinine 0.69, sodium 138, T. bili 0.8, ALP 66, AST 14, ALT 21, INR 1.1, platelets 227 Sodium MELD=9 7/1/2024 RUQ US: Hepatic steatosis. Simple right renal cyst measuring 1.1 cm 7/31/2024 esophageal manometry was overall normal, normal impedence pH study  Last visit, he weaned off PPI, to only taking Tums PRN. He reported losing weight with diet changes and appeared he lost about total of 20 pounds.  Referral sent to Dr. Lockhart again for hiatal hernia but he has not heard anything yet. Has been having swelling in his legs. Saw a Cardiologist, stopped amlodipine. Started Telmisartan and sees him again next week. Swelling improved. Dr. Malik. Says ECHO was good.      - - - - - - - - - - - - - - - - - - Prior history: -He reports back in 2017, he presented to ER for abdominal pain with N/V. CT scan was done on 1/14/2017 and showed moderate sized hiatal hernia. Diffuse low-attenuation of the liver, suggestive of hepatic steatosis. Diffuse mild dilatation of the small bowel with several air-fluid levels. No focal transition point. Enlarged prostate. Fat containing left inguinal hernia. He states he never really followed up on it.  -Seen in our clinic back in 2019 for dysphagia. EGD done by Dr. Benítez 7/2/2019 found mildly severe esophagitis, surgically altered GE junction, due to dysphagia symptoms he was dilated, mild erythema in the gastric antrum, duodenum was normal. Path with mild chronic inactive gastritis and chemical/reactive gastropathy. No H. pylori. No EOE. Colonoscopy done by Dr. Benítez on 8/6/2019 - 4 mm (benign) polyp removed from the sigmoid colon, diverticulosis in the sigmoid colon.   -10/25/2021 Barium swallow by Dr. Sherwood:  Status post fundoplication. The wrap appears intact, however it is herniated above the diaphragm. There is a moderate hiatal hernia present. 12.5 mm barium tablet halted at the GE junction at the level of the fundoplication and did not pass even with additional sips of barium. Very mild gastroesophageal reflux seen while the patient was supine. Otherwise unremarkable.  -Presented to [facility] ED on 11/26/2023 complaining of choking episode. He reported that he was sitting watching football eating pork rice and peas. He started choking and felt like something was lodged in his esophagus. Was able to cough it out but has not been able to handle his secretions or tolerate any oral intake since. Tried carbonated beverages but vomited them back up. At one point had to pull over on the side of the road and vomit secretions. He still felt like he had a piece of pork lodged in his mid esophagus. CBC, CMP, PT/INR unremarkable. CXR with linear scarring at the left lateral lung base. GI consulted for urgent EGD. -EGD was performed by Dr. Fontanez-LA grade C acute esophagitis without bleeding. Food found in the stomach. Erythematous mucosa in the gastric body. An antireflux surgical site was found characterized by healthy-appearing mucosa. Normal duodenum. Path:Distal esophagus biopsy-squamous epithelium with parakeratosis. Negative for intraepithelial eosinophils. Mid esophagus-squamous epithelium with no pathologic abnormality negative for intraepithelial eosinophils. Protonix 40 BID for 4 weeks ordered  -Seen in GI clinic 3/14/2023 for hepatic steatosis. CBC, CMP at the time unremarkable RUQ US 3/29/2023-echogenic liver suggestive of hepatic steatosis. No other significant finding.  -Seen in GI clinic 11/30/2023 for ED follow up of dysphagia.  Barium swallow 12/15/2023 - the patient is again noted s/p fundoplication. The wrap again appears to be superior to the diaphram with moderate hiatal hernia present. Barium traverse the GE junction at the level of the fundoplication. Moderate reflux. Small diverticulum which apears to be at the posterior oropharynx..  US was on 1/31/2024-echogenic liver essentially unchanged. Consider fatty infiltration. Simple cyst of right kidney new on current study.  EGD colonoscopy was performed by Dr. Benítez on 3/20/2024 EGD-medium sized hiatal hernia, erythematous mucosa in the antrum, normal duodenum. Dilation performed in the lower third of the esophagus. Path:Stomach antrum-negative esophagus middle and lower third biopsies with reflux type changes; negative for Mercado's or EOE. Colonoscopy-diverticulosis in the sigmoid colon, internal hemorrhoids. No specimens collected. 5-year follow-up given..

## 2024-10-10 LAB
A/G RATIO: 1.6
ALBUMIN: 4.3
ALKALINE PHOSPHATASE: 67
ALT (SGPT): 23
AST (SGOT): 15
BILIRUBIN, TOTAL: 0.5
BUN/CREATININE RATIO: (no result)
BUN: 24
CALCIUM: 9.4
CARBON DIOXIDE, TOTAL: 27
CHLORIDE: 105
CREATININE: 0.81
EGFR: 98
GLOBULIN, TOTAL: 2.7
GLUCOSE: 89
HEMATOCRIT: 45.9
HEMOGLOBIN: 14.8
INR: 1.1
MCH: 28.9
MCHC: 32.2
MCV: 89.6
MPV: 10.1
PLATELET COUNT: 213
POTASSIUM: 4.5
PROTEIN, TOTAL: 7
PT: 11.9
RDW: 13.2
RED BLOOD CELL COUNT: 5.12
SODIUM: 141
WHITE BLOOD CELL COUNT: 5.4

## 2025-01-10 ENCOUNTER — OFFICE VISIT (OUTPATIENT)
Dept: URBAN - METROPOLITAN AREA CLINIC 18 | Facility: CLINIC | Age: 65
End: 2025-01-10

## 2025-01-24 ENCOUNTER — OFFICE VISIT (OUTPATIENT)
Dept: URBAN - METROPOLITAN AREA CLINIC 18 | Facility: CLINIC | Age: 65
End: 2025-01-24
Payer: MEDICARE

## 2025-01-24 DIAGNOSIS — N28.1 RENAL CYST: ICD-10-CM

## 2025-01-24 DIAGNOSIS — K76.0 FATTY (CHANGE OF) LIVER: ICD-10-CM

## 2025-01-24 PROCEDURE — 76705 ECHO EXAM OF ABDOMEN: CPT | Performed by: INTERNAL MEDICINE

## 2025-05-28 ENCOUNTER — OFFICE VISIT (OUTPATIENT)
Dept: URBAN - METROPOLITAN AREA CLINIC 19 | Facility: CLINIC | Age: 65
End: 2025-05-28
Payer: MEDICARE

## 2025-05-28 DIAGNOSIS — K76.0 HEPATIC STEATOSIS: ICD-10-CM

## 2025-05-28 DIAGNOSIS — R14.0 ABDOMINAL DISTENTION: ICD-10-CM

## 2025-05-28 DIAGNOSIS — K59.01 CONSTIPATION: ICD-10-CM

## 2025-05-28 PROCEDURE — 99214 OFFICE O/P EST MOD 30 MIN: CPT | Performed by: NURSE PRACTITIONER

## 2025-05-28 RX ORDER — TELMISARTAN 80 MG/1
1 TABLET TABLET ORAL ONCE A DAY
Status: ACTIVE | COMMUNITY

## 2025-05-28 RX ORDER — ALBUTEROL SULFATE 90 UG/1
AEROSOL, METERED RESPIRATORY (INHALATION)
Qty: 0 | Refills: 0 | Status: ACTIVE | COMMUNITY
Start: 1900-01-01

## 2025-05-28 RX ORDER — TADALAFIL 5 MG/1
1 TABLET AS NEEDED TABLET, FILM COATED ORAL ONCE A DAY
Status: ACTIVE | COMMUNITY

## 2025-05-28 RX ORDER — PANTOPRAZOLE SODIUM 40 MG/1
1 TABLET TABLET, DELAYED RELEASE ORAL ONCE A DAY
Qty: 90 TABLET | Refills: 3 | Status: ACTIVE | COMMUNITY
Start: 2023-11-30

## 2025-05-28 NOTE — HPI-TODAY'S VISIT:
Mr. Valenzuela is a 62 yo male with PMH of HTN, ROLAND on CPAP, hernia repair in 1995, Afib s/p cardiac ablation (no longer on Eliquis, now on baby aspirin), hepatic steatosis who presents today for concern of constipation.  He reports he recently had a PAE (prostate artery embolization) about 6-7 days ago for enlarged prostate which cuts off blood flow and eventually shrinks the prostate. Had anesthesia with it. He is now on bactrim, cefuroxime, pyridium, ibuprofen, and finished steroid pack. He got constipated then took colace which caused him a lot of diarrhea. Then had coffee ground grindy looking stools in the toilet at first then changed to all different colors and now back to normal brown again. When he pushes sometimes nothing comes out. LBM today very little. He is worried he may have prolapsed hemorrhoid now. States he has to do the other side on June 11.         - - - - - - - - - - - - - - - - - - Prior history: -He reports back in 2017, he presented to ER for abdominal pain with N/V. CT scan was done on 1/14/2017 and showed moderate sized hiatal hernia. Diffuse low-attenuation of the liver, suggestive of hepatic steatosis. Diffuse mild dilatation of the small bowel with several air-fluid levels. No focal transition point. Enlarged prostate. Fat containing left inguinal hernia. He states he never really followed up on it.  -Seen in our clinic back in 2019 for dysphagia. EGD done by Dr. Benítez 7/2/2019 found mildly severe esophagitis, surgically altered GE junction, due to dysphagia symptoms he was dilated, mild erythema in the gastric antrum, duodenum was normal. Path with mild chronic inactive gastritis and chemical/reactive gastropathy. No H. pylori. No EOE. Colonoscopy done by Dr. Benítez on 8/6/2019 - 4 mm (benign) polyp removed from the sigmoid colon, diverticulosis in the sigmoid colon.   -10/25/2021 Barium swallow by Dr. Sherwood:  Status post fundoplication. The wrap appears intact, however it is herniated above the diaphragm. There is a moderate hiatal hernia present. 12.5 mm barium tablet halted at the GE junction at the level of the fundoplication and did not pass even with additional sips of barium. Very mild gastroesophageal reflux seen while the patient was supine. Otherwise unremarkable.  -Presented to [facility] ED on 11/26/2023 complaining of choking episode. He reported that he was sitting watching football eating pork rice and peas. He started choking and felt like something was lodged in his esophagus. Was able to cough it out but has not been able to handle his secretions or tolerate any oral intake since. Tried carbonated beverages but vomited them back up. At one point had to pull over on the side of the road and vomit secretions. He still felt like he had a piece of pork lodged in his mid esophagus. CBC, CMP, PT/INR unremarkable. CXR with linear scarring at the left lateral lung base. GI consulted for urgent EGD. -EGD was performed by Dr. Fontanez-LA grade C acute esophagitis without bleeding. Food found in the stomach. Erythematous mucosa in the gastric body. An antireflux surgical site was found characterized by healthy-appearing mucosa. Normal duodenum. Path:Distal esophagus biopsy-squamous epithelium with parakeratosis. Negative for intraepithelial eosinophils. Mid esophagus-squamous epithelium with no pathologic abnormality negative for intraepithelial eosinophils. Protonix 40 BID for 4 weeks ordered  -Seen in GI clinic 3/14/2023 for hepatic steatosis. CBC, CMP at the time unremarkable RUQ US 3/29/2023-echogenic liver suggestive of hepatic steatosis. No other significant finding.  -Seen in GI clinic 11/30/2023 for ED follow up of dysphagia.  Barium swallow 12/15/2023 - the patient is again noted s/p fundoplication. The wrap again appears to be superior to the diaphram with moderate hiatal hernia present. Barium traverse the GE junction at the level of the fundoplication. Moderate reflux. Small diverticulum which apears to be at the posterior oropharynx..  US on 1/31/2024-echogenic liver essentially unchanged. Consider fatty infiltration. Simple cyst of right kidney new on current study.  EGD colonoscopy was performed by Dr. Benítez on 3/20/2024 EGD-medium sized hiatal hernia, erythematous mucosa in the antrum, normal duodenum. Dilation performed in the lower third of the esophagus. Path:Stomach antrum-negative esophagus middle and lower third biopsies with reflux type changes; negative for Mercado's or EOE. Colonoscopy-diverticulosis in the sigmoid colon, internal hemorrhoids. No specimens collected. 5-year follow-up given.  6/28/2024 CBC was normal, creatinine 0.69, sodium 138, T. bili 0.8, ALP 66, AST 14, ALT 21, INR 1.1, platelets 227 Sodium MELD=9 7/1/2024 RUQ US: Hepatic steatosis. Simple right renal cyst measuring 1.1 cm 7/31/2024 esophageal manometry was overall normal, normal impedence pH study Referral sent to Dr. Lockhart again for hiatal hernia

## 2025-05-29 ENCOUNTER — TELEPHONE ENCOUNTER (OUTPATIENT)
Dept: URBAN - METROPOLITAN AREA CLINIC 19 | Facility: CLINIC | Age: 65
End: 2025-05-29

## 2025-05-29 ENCOUNTER — LAB OUTSIDE AN ENCOUNTER (OUTPATIENT)
Dept: URBAN - METROPOLITAN AREA CLINIC 80 | Facility: CLINIC | Age: 65
End: 2025-05-29

## 2025-05-29 LAB
ALBUMIN/GLOBULIN RATIO: 1.4
ALBUMIN: 4.2
ALKALINE PHOSPHATASE: 78
ALT: 46
AST: 20
BILIRUBIN, DIRECT: 0.1
BILIRUBIN, INDIRECT: 0.3
BILIRUBIN, TOTAL: 0.4
FIB 4 INDEX: 0.67
FIB 4 INTERPRETATION: (no result)
FIB 4 SUMMARY: (no result)
GLOBULIN: 3
HEMATOCRIT: 45.6
HEMOGLOBIN: 15.2
INR: 1.1
MCH: 30
MCHC: 33.3
MCV: 89.9
MPV: 9.5
PLATELET COUNT: 284
PLATELET COUNT: 284
PROTEIN, TOTAL: 7.2
PT: 11.5
RDW: 13
RED BLOOD CELL COUNT: 5.07
TSH W/REFLEX TO FT4: 0.98
WHITE BLOOD CELL COUNT: 8.3

## 2025-05-29 RX ORDER — HYDROCORTISONE ACETATE 25 MG/1
1 SUPPOSITORY SUPPOSITORY RECTAL ONCE A DAY
Qty: 7 | Refills: 3 | OUTPATIENT
Start: 2025-05-30 | End: 2025-06-27

## 2025-07-28 ENCOUNTER — OFFICE VISIT (OUTPATIENT)
Dept: URBAN - METROPOLITAN AREA CLINIC 19 | Facility: CLINIC | Age: 65
End: 2025-07-28

## 2025-07-31 ENCOUNTER — OFFICE VISIT (OUTPATIENT)
Dept: URBAN - METROPOLITAN AREA CLINIC 19 | Facility: CLINIC | Age: 65
End: 2025-07-31

## 2025-07-31 RX ORDER — TELMISARTAN 80 MG/1
1 TABLET TABLET ORAL ONCE A DAY
Status: ACTIVE | COMMUNITY

## 2025-07-31 RX ORDER — PANTOPRAZOLE SODIUM 40 MG/1
1 TABLET TABLET, DELAYED RELEASE ORAL ONCE A DAY
Qty: 90 TABLET | Refills: 3 | Status: ACTIVE | COMMUNITY
Start: 2023-11-30

## 2025-07-31 RX ORDER — TADALAFIL 5 MG/1
1 TABLET AS NEEDED TABLET, FILM COATED ORAL ONCE A DAY
Status: ACTIVE | COMMUNITY

## 2025-07-31 RX ORDER — ALBUTEROL SULFATE 90 UG/1
AEROSOL, METERED RESPIRATORY (INHALATION)
Qty: 0 | Refills: 0 | Status: ACTIVE | COMMUNITY
Start: 1900-01-01

## 2025-07-31 RX ORDER — HYDROCORTISONE ACETATE 25 MG/1
1 SUPPOSITORY SUPPOSITORY RECTAL ONCE A DAY
Qty: 30 | Refills: 1 | OUTPATIENT
Start: 2025-07-31 | End: 2025-09-29

## 2025-08-05 ENCOUNTER — CLAIMS CREATED FROM THE CLAIM WINDOW (OUTPATIENT)
Dept: URBAN - METROPOLITAN AREA CLINIC 117 | Facility: CLINIC | Age: 65
End: 2025-08-05
Payer: MEDICARE

## 2025-08-05 ENCOUNTER — OFFICE VISIT (OUTPATIENT)
Dept: URBAN - METROPOLITAN AREA CLINIC 127 | Facility: CLINIC | Age: 65
End: 2025-08-05
Payer: MEDICARE

## 2025-08-05 DIAGNOSIS — K76.0 HEPATIC STEATOSIS: ICD-10-CM

## 2025-08-05 PROCEDURE — 76981 USE PARENCHYMA: CPT | Performed by: INTERNAL MEDICINE

## 2025-08-05 RX ORDER — TELMISARTAN 80 MG/1
1 TABLET TABLET ORAL ONCE A DAY
Status: ACTIVE | COMMUNITY

## 2025-08-05 RX ORDER — PANTOPRAZOLE SODIUM 40 MG/1
1 TABLET TABLET, DELAYED RELEASE ORAL ONCE A DAY
Qty: 90 TABLET | Refills: 3 | Status: ACTIVE | COMMUNITY
Start: 2023-11-30

## 2025-08-05 RX ORDER — TADALAFIL 5 MG/1
1 TABLET AS NEEDED TABLET, FILM COATED ORAL ONCE A DAY
Status: ACTIVE | COMMUNITY

## 2025-08-05 RX ORDER — HYDROCORTISONE ACETATE 25 MG/1
1 SUPPOSITORY SUPPOSITORY RECTAL ONCE A DAY
Qty: 30 | Refills: 1 | Status: ACTIVE | COMMUNITY
Start: 2025-07-31 | End: 2025-09-29

## 2025-08-05 RX ORDER — ALBUTEROL SULFATE 90 UG/1
AEROSOL, METERED RESPIRATORY (INHALATION)
Qty: 0 | Refills: 0 | Status: ACTIVE | COMMUNITY
Start: 1900-01-01